# Patient Record
Sex: MALE | Race: WHITE | Employment: OTHER | URBAN - METROPOLITAN AREA
[De-identification: names, ages, dates, MRNs, and addresses within clinical notes are randomized per-mention and may not be internally consistent; named-entity substitution may affect disease eponyms.]

---

## 2018-09-05 ENCOUNTER — HOSPITAL ENCOUNTER (OUTPATIENT)
Dept: RADIATION THERAPY | Age: 68
Discharge: HOME | End: 2018-09-05
Payer: OTHER GOVERNMENT

## 2018-09-17 ENCOUNTER — HOSPITAL ENCOUNTER (OUTPATIENT)
Dept: RADIATION THERAPY | Age: 68
Discharge: HOME | End: 2018-09-17
Payer: OTHER GOVERNMENT

## 2018-09-19 ENCOUNTER — HOSPITAL ENCOUNTER (OUTPATIENT)
Dept: RADIATION THERAPY | Age: 68
Discharge: HOME | End: 2018-09-19
Payer: OTHER GOVERNMENT

## 2018-09-19 LAB — CREAT UR-MCNC: 0.8 MG/DL (ref 0.6–1.3)

## 2018-09-19 PROCEDURE — 77334 RADIATION TREATMENT AID(S): CPT

## 2018-09-19 PROCEDURE — 82565 ASSAY OF CREATININE: CPT

## 2018-09-20 ENCOUNTER — HOSPITAL ENCOUNTER (OUTPATIENT)
Dept: RADIATION THERAPY | Age: 68
Discharge: HOME | End: 2018-09-20
Payer: OTHER GOVERNMENT

## 2018-09-21 ENCOUNTER — HOSPITAL ENCOUNTER (OUTPATIENT)
Dept: RADIATION THERAPY | Age: 68
Discharge: HOME | End: 2018-09-21
Payer: OTHER GOVERNMENT

## 2018-09-21 PROCEDURE — 77370 RADIATION PHYSICS CONSULT: CPT

## 2018-09-24 ENCOUNTER — HOSPITAL ENCOUNTER (OUTPATIENT)
Dept: RADIATION THERAPY | Age: 68
Discharge: HOME | End: 2018-09-24
Payer: OTHER GOVERNMENT

## 2018-09-24 PROCEDURE — 77301 RADIOTHERAPY DOSE PLAN IMRT: CPT

## 2018-09-24 PROCEDURE — 77338 DESIGN MLC DEVICE FOR IMRT: CPT

## 2018-09-24 PROCEDURE — 77300 RADIATION THERAPY DOSE PLAN: CPT

## 2018-09-25 ENCOUNTER — HOSPITAL ENCOUNTER (OUTPATIENT)
Dept: RADIATION THERAPY | Age: 68
Discharge: HOME | End: 2018-09-25
Payer: OTHER GOVERNMENT

## 2018-09-25 PROCEDURE — 77386 HC IMRT TRMT DLVR COMPL: CPT

## 2018-09-26 ENCOUNTER — HOSPITAL ENCOUNTER (OUTPATIENT)
Dept: RADIATION THERAPY | Age: 68
Discharge: HOME | End: 2018-09-26
Payer: OTHER GOVERNMENT

## 2018-09-26 PROCEDURE — 77385 HC IMRT TRMT DLVR SMPL: CPT

## 2018-09-27 ENCOUNTER — HOSPITAL ENCOUNTER (OUTPATIENT)
Dept: RADIATION THERAPY | Age: 68
Discharge: HOME | End: 2018-09-27
Payer: OTHER GOVERNMENT

## 2018-09-27 PROCEDURE — 77386 HC IMRT TRMT DLVR COMPL: CPT

## 2018-09-28 ENCOUNTER — HOSPITAL ENCOUNTER (OUTPATIENT)
Dept: RADIATION THERAPY | Age: 68
Discharge: HOME | End: 2018-09-28
Payer: OTHER GOVERNMENT

## 2018-09-28 PROCEDURE — 77386 HC IMRT TRMT DLVR COMPL: CPT

## 2018-10-01 ENCOUNTER — HOSPITAL ENCOUNTER (OUTPATIENT)
Dept: RADIATION THERAPY | Age: 68
Discharge: HOME | End: 2018-10-01
Payer: OTHER GOVERNMENT

## 2018-10-01 PROCEDURE — 77336 RADIATION PHYSICS CONSULT: CPT

## 2018-10-01 PROCEDURE — 77386 HC IMRT TRMT DLVR COMPL: CPT

## 2018-10-02 ENCOUNTER — HOSPITAL ENCOUNTER (OUTPATIENT)
Dept: RADIATION THERAPY | Age: 68
Discharge: HOME | End: 2018-10-02
Payer: OTHER GOVERNMENT

## 2018-10-02 PROCEDURE — 77386 HC IMRT TRMT DLVR COMPL: CPT

## 2018-10-03 ENCOUNTER — HOSPITAL ENCOUNTER (OUTPATIENT)
Dept: RADIATION THERAPY | Age: 68
Discharge: HOME | End: 2018-10-03
Payer: OTHER GOVERNMENT

## 2018-10-03 PROCEDURE — 77386 HC IMRT TRMT DLVR COMPL: CPT

## 2018-10-04 ENCOUNTER — HOSPITAL ENCOUNTER (OUTPATIENT)
Dept: RADIATION THERAPY | Age: 68
Discharge: HOME | End: 2018-10-04
Payer: OTHER GOVERNMENT

## 2018-10-04 PROCEDURE — 77386 HC IMRT TRMT DLVR COMPL: CPT

## 2018-10-05 ENCOUNTER — HOSPITAL ENCOUNTER (OUTPATIENT)
Dept: RADIATION THERAPY | Age: 68
Discharge: HOME | End: 2018-10-05
Payer: OTHER GOVERNMENT

## 2018-10-05 PROCEDURE — 77386 HC IMRT TRMT DLVR COMPL: CPT

## 2018-10-08 ENCOUNTER — HOSPITAL ENCOUNTER (OUTPATIENT)
Dept: RADIATION THERAPY | Age: 68
Discharge: HOME | End: 2018-10-08
Payer: OTHER GOVERNMENT

## 2018-10-08 PROCEDURE — 77386 HC IMRT TRMT DLVR COMPL: CPT

## 2018-10-08 PROCEDURE — 77336 RADIATION PHYSICS CONSULT: CPT

## 2018-10-09 ENCOUNTER — HOSPITAL ENCOUNTER (OUTPATIENT)
Dept: RADIATION THERAPY | Age: 68
Discharge: HOME | End: 2018-10-09
Payer: OTHER GOVERNMENT

## 2018-10-09 PROCEDURE — 77386 HC IMRT TRMT DLVR COMPL: CPT

## 2018-10-10 ENCOUNTER — HOSPITAL ENCOUNTER (OUTPATIENT)
Dept: RADIATION THERAPY | Age: 68
Discharge: HOME | End: 2018-10-10
Payer: OTHER GOVERNMENT

## 2018-10-10 PROCEDURE — 77386 HC IMRT TRMT DLVR COMPL: CPT

## 2018-10-11 ENCOUNTER — HOSPITAL ENCOUNTER (OUTPATIENT)
Dept: RADIATION THERAPY | Age: 68
Discharge: HOME | End: 2018-10-11
Payer: OTHER GOVERNMENT

## 2018-10-11 PROCEDURE — 77386 HC IMRT TRMT DLVR COMPL: CPT

## 2018-10-12 ENCOUNTER — HOSPITAL ENCOUNTER (OUTPATIENT)
Dept: RADIATION THERAPY | Age: 68
Discharge: HOME | End: 2018-10-12
Payer: OTHER GOVERNMENT

## 2018-10-12 PROCEDURE — 77385 HC IMRT TRMT DLVR SMPL: CPT

## 2018-10-15 ENCOUNTER — HOSPITAL ENCOUNTER (OUTPATIENT)
Dept: RADIATION THERAPY | Age: 68
Discharge: HOME | End: 2018-10-15
Payer: OTHER GOVERNMENT

## 2018-10-15 PROCEDURE — 77336 RADIATION PHYSICS CONSULT: CPT

## 2018-10-15 PROCEDURE — 77386 HC IMRT TRMT DLVR COMPL: CPT

## 2018-10-16 ENCOUNTER — HOSPITAL ENCOUNTER (OUTPATIENT)
Dept: RADIATION THERAPY | Age: 68
Discharge: HOME | End: 2018-10-16
Payer: OTHER GOVERNMENT

## 2018-10-16 PROCEDURE — 77386 HC IMRT TRMT DLVR COMPL: CPT

## 2018-10-17 ENCOUNTER — HOSPITAL ENCOUNTER (OUTPATIENT)
Dept: RADIATION THERAPY | Age: 68
Discharge: HOME | End: 2018-10-17
Payer: OTHER GOVERNMENT

## 2018-10-17 PROCEDURE — 77386 HC IMRT TRMT DLVR COMPL: CPT

## 2018-10-18 ENCOUNTER — HOSPITAL ENCOUNTER (OUTPATIENT)
Dept: RADIATION THERAPY | Age: 68
Discharge: HOME | End: 2018-10-18
Payer: OTHER GOVERNMENT

## 2018-10-18 PROCEDURE — 77386 HC IMRT TRMT DLVR COMPL: CPT

## 2018-10-19 ENCOUNTER — HOSPITAL ENCOUNTER (OUTPATIENT)
Dept: RADIATION THERAPY | Age: 68
Discharge: HOME | End: 2018-10-19
Payer: OTHER GOVERNMENT

## 2018-10-19 PROCEDURE — 77386 HC IMRT TRMT DLVR COMPL: CPT

## 2018-10-22 ENCOUNTER — HOSPITAL ENCOUNTER (OUTPATIENT)
Dept: RADIATION THERAPY | Age: 68
Discharge: HOME | End: 2018-10-22
Payer: OTHER GOVERNMENT

## 2018-10-22 PROCEDURE — 77336 RADIATION PHYSICS CONSULT: CPT

## 2018-10-22 PROCEDURE — 77386 HC IMRT TRMT DLVR COMPL: CPT

## 2018-10-23 ENCOUNTER — HOSPITAL ENCOUNTER (OUTPATIENT)
Dept: RADIATION THERAPY | Age: 68
Discharge: HOME | End: 2018-10-23
Payer: OTHER GOVERNMENT

## 2018-10-23 PROCEDURE — 77386 HC IMRT TRMT DLVR COMPL: CPT

## 2018-10-24 ENCOUNTER — HOSPITAL ENCOUNTER (OUTPATIENT)
Dept: RADIATION THERAPY | Age: 68
Discharge: HOME | End: 2018-10-24
Payer: OTHER GOVERNMENT

## 2018-10-24 PROCEDURE — 77386 HC IMRT TRMT DLVR COMPL: CPT

## 2018-10-25 ENCOUNTER — HOSPITAL ENCOUNTER (OUTPATIENT)
Dept: RADIATION THERAPY | Age: 68
Discharge: HOME | End: 2018-10-25
Payer: OTHER GOVERNMENT

## 2018-10-25 PROCEDURE — 77386 HC IMRT TRMT DLVR COMPL: CPT

## 2018-10-26 ENCOUNTER — HOSPITAL ENCOUNTER (OUTPATIENT)
Dept: RADIATION THERAPY | Age: 68
Discharge: HOME | End: 2018-10-26
Payer: OTHER GOVERNMENT

## 2018-10-26 PROCEDURE — 77386 HC IMRT TRMT DLVR COMPL: CPT

## 2018-10-29 ENCOUNTER — HOSPITAL ENCOUNTER (OUTPATIENT)
Dept: RADIATION THERAPY | Age: 68
Discharge: HOME | End: 2018-10-29
Payer: OTHER GOVERNMENT

## 2018-10-29 PROCEDURE — 77386 HC IMRT TRMT DLVR COMPL: CPT

## 2018-10-29 PROCEDURE — 77336 RADIATION PHYSICS CONSULT: CPT

## 2018-10-30 ENCOUNTER — HOSPITAL ENCOUNTER (OUTPATIENT)
Dept: RADIATION THERAPY | Age: 68
Discharge: HOME | End: 2018-10-30
Payer: OTHER GOVERNMENT

## 2018-10-30 PROCEDURE — 77386 HC IMRT TRMT DLVR COMPL: CPT

## 2018-10-31 ENCOUNTER — HOSPITAL ENCOUNTER (OUTPATIENT)
Dept: RADIATION THERAPY | Age: 68
Discharge: HOME | End: 2018-10-31
Payer: OTHER GOVERNMENT

## 2018-10-31 PROCEDURE — 77386 HC IMRT TRMT DLVR COMPL: CPT

## 2018-11-02 ENCOUNTER — HOSPITAL ENCOUNTER (OUTPATIENT)
Dept: RADIATION THERAPY | Age: 68
Discharge: HOME | End: 2018-11-02
Payer: OTHER GOVERNMENT

## 2018-11-05 ENCOUNTER — HOSPITAL ENCOUNTER (OUTPATIENT)
Dept: RADIATION THERAPY | Age: 68
Discharge: HOME | End: 2018-11-05
Payer: OTHER GOVERNMENT

## 2018-11-05 PROCEDURE — 77386 HC IMRT TRMT DLVR COMPL: CPT

## 2018-11-06 ENCOUNTER — HOSPITAL ENCOUNTER (OUTPATIENT)
Dept: RADIATION THERAPY | Age: 68
Discharge: HOME | End: 2018-11-06
Payer: OTHER GOVERNMENT

## 2018-11-06 PROCEDURE — 77386 HC IMRT TRMT DLVR COMPL: CPT

## 2018-11-07 ENCOUNTER — HOSPITAL ENCOUNTER (OUTPATIENT)
Dept: RADIATION THERAPY | Age: 68
Discharge: HOME | End: 2018-11-07
Payer: OTHER GOVERNMENT

## 2018-11-07 PROCEDURE — 77386 HC IMRT TRMT DLVR COMPL: CPT

## 2018-11-07 PROCEDURE — 77336 RADIATION PHYSICS CONSULT: CPT

## 2019-01-02 ENCOUNTER — APPOINTMENT (OUTPATIENT)
Dept: RADIATION THERAPY | Age: 69
End: 2019-01-02

## 2019-03-12 ENCOUNTER — HOSPITAL ENCOUNTER (OUTPATIENT)
Dept: MRI IMAGING | Age: 69
Discharge: HOME | End: 2019-03-12
Payer: OTHER GOVERNMENT

## 2019-03-12 DIAGNOSIS — M25.561 RIGHT KNEE PAIN: ICD-10-CM

## 2019-03-12 PROCEDURE — 73721 MRI JNT OF LWR EXTRE W/O DYE: CPT

## 2019-04-30 ENCOUNTER — OFFICE VISIT (OUTPATIENT)
Dept: ORTHOPEDIC SURGERY | Facility: CLINIC | Age: 69
End: 2019-04-30

## 2019-04-30 DIAGNOSIS — S83.231A COMPLEX TEAR OF MEDIAL MENISCUS OF RIGHT KNEE AS CURRENT INJURY, INITIAL ENCOUNTER: ICD-10-CM

## 2019-04-30 DIAGNOSIS — M25.461 EFFUSION OF RIGHT KNEE: ICD-10-CM

## 2019-04-30 DIAGNOSIS — M17.11 PRIMARY OSTEOARTHRITIS OF RIGHT KNEE: Primary | ICD-10-CM

## 2019-04-30 DIAGNOSIS — S83.271A COMPLEX TEAR OF LATERAL MENISCUS OF RIGHT KNEE AS CURRENT INJURY, INITIAL ENCOUNTER: ICD-10-CM

## 2019-04-30 RX ORDER — TRIAMCINOLONE ACETONIDE 40 MG/ML
40 INJECTION, SUSPENSION INTRA-ARTICULAR; INTRAMUSCULAR ONCE
Qty: 1 ML | Refills: 0
Start: 2019-04-30 | End: 2019-04-30

## 2019-05-01 NOTE — PROGRESS NOTES
Patient: Jessica Bates                MRN: [id number]       SSN: [id number]  YOB: 1950        AGE: 70 y.o. SEX: male  There is no height or weight on file to calculate BMI. PCP: Sofy Melton MD  04/30/19    Chief Complaint: Right knee pain    HPI: 70year old male with right knee pain x several months. Pian 4/10 today. No antecedent pain prior to January. Was getting out of a desk chair when his knee \"popped\" and then he noted swelling and has had pain ever since. Pain has slightly improved but not completely. Has had an MRI of the knee, no x rays. History reviewed. No pertinent past medical history. History reviewed. No pertinent family history. Not on File    History reviewed. No pertinent surgical history.     Social History     Socioeconomic History    Marital status:      Spouse name: Not on file    Number of children: Not on file    Years of education: Not on file    Highest education level: Not on file   Occupational History    Not on file   Social Needs    Financial resource strain: Not on file    Food insecurity:     Worry: Not on file     Inability: Not on file    Transportation needs:     Medical: Not on file     Non-medical: Not on file   Tobacco Use    Smoking status: Not on file   Substance and Sexual Activity    Alcohol use: Not on file    Drug use: Not on file    Sexual activity: Not on file   Lifestyle    Physical activity:     Days per week: Not on file     Minutes per session: Not on file    Stress: Not on file   Relationships    Social connections:     Talks on phone: Not on file     Gets together: Not on file     Attends [employer] service: Not on file     Active member of club or organization: Not on file     Attends meetings of clubs or organizations: Not on file     Relationship status: Not on file    Intimate partner violence:     Fear of current or ex partner: Not on file     Emotionally abused: Not on file     Physically abused: Not on file     Forced sexual activity: Not on file   Other Topics Concern    Not on file   Social History Narrative    Not on file       REVIEW OF SYSTEMS:      CON: negative for recent weight loss/gain, fever, or chills  EYE: negative for double or blurry vision  ENT: negative for hoarseness  RS:   negative for cough, URI, SOB  CV:  negative for chest pain, palpitations  GI:    negative for blood in stool, nausea/vomiting  :  negative for blood in urine  MS: As per HPI  Other systems reviewed and noted below. PHYSICAL EXAMINATION:  There were no vitals taken for this visit. There is no height or weight on file to calculate BMI. GENERAL: Alert and oriented x3, in no acute distress, well-developed, well-nourished. HEENT: Normocephalic, atraumatic. RESP: Non labored breathing with equal chest rise on inspiration. CV: Well perfused extremities. No cyanosis or clubbing noted. ABDOMEN: Soft, non-tender, non-distended. Right knee with effusion, no warmth or erythema. Able to recreate crepitus/popping with knee ROM. Knee locks on ROM testing today. SILT/NVI distally. Globally tender with guarding on examination today. No obvious deformity.     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  Effusion    A/P  Right knee pain/effusion  -Possibly arthritic flare with noted significant DJD on MRI  -Recommend conesrvative treatment initially, not optimal surgical candidate due to underlying lung ca  -Aspiration of 25 cc of fluid today in the office followed by steroid injection  -f/u 1 month    [facility]  OFFICE PROCEDURE PROGRESS NOTE        Chart reviewed for the following:   Lupe Hinkle MD, have reviewed the History, Physical and updated the Allergic reactions for [facility] performed immediately prior to start of procedure:   Lupe Hinkle MD, have performed the following reviews on [place] prior to the start of the procedure:            * Patient was identified by name and date of birth   * Agreement on procedure being performed was verified  * Risks and Benefits explained to the patient  * Procedure site verified and marked as necessary  * Patient was positioned for comfort  * Consent was signed and verified    Time: 1000      Date of procedure: 4/30/2019    Procedure performed by:  Padma Camacho MD    Provider assisted by: Kavin Carranza LPN    Patient assisted by: self    How tolerated by patient: tolerated the procedure well with no complications    Post Procedural Pain Scale: 0 - No Hurt    Comments: none                Electronically signed by: Padma Camacho MD

## 2019-05-02 VITALS
SYSTOLIC BLOOD PRESSURE: 108 MMHG | HEART RATE: 78 BPM | TEMPERATURE: 97.6 F | DIASTOLIC BLOOD PRESSURE: 69 MMHG | HEIGHT: 71 IN | BODY MASS INDEX: 27.52 KG/M2 | WEIGHT: 196.6 LBS | RESPIRATION RATE: 16 BRPM